# Patient Record
Sex: FEMALE | Race: WHITE
[De-identification: names, ages, dates, MRNs, and addresses within clinical notes are randomized per-mention and may not be internally consistent; named-entity substitution may affect disease eponyms.]

---

## 2020-02-17 ENCOUNTER — HOSPITAL ENCOUNTER (EMERGENCY)
Dept: HOSPITAL 47 - EC | Age: 20
Discharge: HOME | End: 2020-02-17
Payer: COMMERCIAL

## 2020-02-17 VITALS
RESPIRATION RATE: 18 BRPM | DIASTOLIC BLOOD PRESSURE: 78 MMHG | TEMPERATURE: 98.4 F | SYSTOLIC BLOOD PRESSURE: 132 MMHG | HEART RATE: 72 BPM

## 2020-02-17 DIAGNOSIS — Z32.02: ICD-10-CM

## 2020-02-17 DIAGNOSIS — N93.8: Primary | ICD-10-CM

## 2020-02-17 LAB
ALBUMIN SERPL-MCNC: 4.9 G/DL (ref 3.5–5)
ALP SERPL-CCNC: 96 U/L (ref 38–126)
ALT SERPL-CCNC: 135 U/L (ref 4–34)
ANION GAP SERPL CALC-SCNC: 10 MMOL/L
APTT BLD: 24.6 SEC (ref 22–30)
AST SERPL-CCNC: 78 U/L (ref 14–36)
BASOPHILS # BLD AUTO: 0.1 K/UL (ref 0–0.2)
BASOPHILS NFR BLD AUTO: 1 %
BUN SERPL-SCNC: 12 MG/DL (ref 7–17)
CALCIUM SPEC-MCNC: 9.5 MG/DL (ref 8.4–10.2)
CHLORIDE SERPL-SCNC: 104 MMOL/L (ref 98–107)
CO2 SERPL-SCNC: 24 MMOL/L (ref 22–30)
EOSINOPHIL # BLD AUTO: 0.9 K/UL (ref 0–0.7)
EOSINOPHIL NFR BLD AUTO: 8 %
ERYTHROCYTE [DISTWIDTH] IN BLOOD BY AUTOMATED COUNT: 4.93 M/UL (ref 3.8–5.4)
ERYTHROCYTE [DISTWIDTH] IN BLOOD: 12.9 % (ref 11.5–15.5)
GLUCOSE SERPL-MCNC: 78 MG/DL (ref 74–99)
HCT VFR BLD AUTO: 44.4 % (ref 34–46)
HGB BLD-MCNC: 15.2 GM/DL (ref 11.4–16)
INR PPP: 0.9 (ref ?–1.2)
LYMPHOCYTES # SPEC AUTO: 2.6 K/UL (ref 1–4.8)
LYMPHOCYTES NFR SPEC AUTO: 23 %
MCH RBC QN AUTO: 30.8 PG (ref 25–35)
MCHC RBC AUTO-ENTMCNC: 34.2 G/DL (ref 31–37)
MCV RBC AUTO: 90 FL (ref 80–100)
MONOCYTES # BLD AUTO: 0.5 K/UL (ref 0–1)
MONOCYTES NFR BLD AUTO: 4 %
NEUTROPHILS # BLD AUTO: 7.2 K/UL (ref 1.3–7.7)
NEUTROPHILS NFR BLD AUTO: 63 %
PLATELET # BLD AUTO: 360 K/UL (ref 150–450)
POTASSIUM SERPL-SCNC: 4.2 MMOL/L (ref 3.5–5.1)
PROT SERPL-MCNC: 8.3 G/DL (ref 6.3–8.2)
PT BLD: 9.8 SEC (ref 9–12)
SODIUM SERPL-SCNC: 138 MMOL/L (ref 137–145)
WBC # BLD AUTO: 11.4 K/UL (ref 3.8–10.6)

## 2020-02-17 PROCEDURE — 85610 PROTHROMBIN TIME: CPT

## 2020-02-17 PROCEDURE — 81025 URINE PREGNANCY TEST: CPT

## 2020-02-17 PROCEDURE — 80053 COMPREHEN METABOLIC PANEL: CPT

## 2020-02-17 PROCEDURE — 85025 COMPLETE CBC W/AUTO DIFF WBC: CPT

## 2020-02-17 PROCEDURE — 96361 HYDRATE IV INFUSION ADD-ON: CPT

## 2020-02-17 PROCEDURE — 96360 HYDRATION IV INFUSION INIT: CPT

## 2020-02-17 PROCEDURE — 36415 COLL VENOUS BLD VENIPUNCTURE: CPT

## 2020-02-17 PROCEDURE — 99284 EMERGENCY DEPT VISIT MOD MDM: CPT

## 2020-02-17 PROCEDURE — 93975 VASCULAR STUDY: CPT

## 2020-02-17 PROCEDURE — 85730 THROMBOPLASTIN TIME PARTIAL: CPT

## 2020-02-17 PROCEDURE — 76830 TRANSVAGINAL US NON-OB: CPT

## 2020-02-17 NOTE — ED
General Adult HPI





- General


Chief complaint: Vaginal Bleeding


Stated complaint: excessive menstrual bleeding


Time Seen by Provider: 02/17/20 18:12


Source: patient


Mode of arrival: ambulatory


Limitations: no limitations





- History of Present Illness


Initial comments: 


19-year-old female patient presents to the emergency department today for 

evaluation of heavy vaginal bleeding for the last 2 weeks.  Patient states that 

she started her period at the normal time however the bleeding was heavier than 

usual.  States that she is passing clots the size of quarters.  States that the 

bleeding has been heavy every day since its onset.  States that she is changing 

her pad every hour.  Patient states she is having lower abdominal cramping 

consistent with her usual menstrual cramping.  She denies any fever or chills.  

Patient states she has been feeling somewhat more weak and rundown than usual.  

She denies any history of clotting disorders.  Denies any use of anticoagulants 

or antiplatelet medications.  Patient states she has currently being evaluated 

for possible vasovagal syndrome versus seizures. Patient denies any recent rash,

fever, chills, shortness breath, chest pain, nausea, vomiting, diarrhea, 

constipation, back pain, numbness, tingling, dizziness, hematuria, dysuria, 

urinary urgency, urinary frequency, headache, visual changes, or any other 

complaints.








- Related Data


                                    Allergies











Allergy/AdvReac Type Severity Reaction Status Date / Time


 


No Known Allergies Allergy   Verified 02/17/20 17:43














Review of Systems


ROS Statement: 


Those systems with pertinent positive or pertinent negative responses have been 

documented in the HPI.





ROS Other: All systems not noted in ROS Statement are negative.





Past Medical History


Past Medical History: No Reported History


History of Any Multi-Drug Resistant Organisms: None Reported


Additional Past Surgical History / Comment(s): possible seizure disorder


Past Psychological History: Anxiety, Depression


Smoking Status: Never smoker


Past Alcohol Use History: None Reported


Past Drug Use History: Marijuana





General Exam


Limitations: no limitations


General appearance: alert, in no apparent distress, other (This is a well-

developed, well-nourished adult female patient in no acute distress, however 

vital signs upon presentation are temperature 98.5F, pulse 74, respirations 16,

blood pressure 146/79, pulse ox 97% on room air.)


Eye exam: Present: normal appearance, PERRL, EOMI.  Absent: scleral icterus, 

conjunctival injection, periorbital swelling


ENT exam: Present: normal exam, normal oropharynx, mucous membranes moist


Respiratory exam: Present: normal lung sounds bilaterally.  Absent: respiratory 

distress, wheezes, rales, rhonchi, stridor


Cardiovascular Exam: Present: regular rate, normal rhythm, normal heart sounds. 

Absent: systolic murmur, diastolic murmur, rubs, gallop, clicks


GI/Abdominal exam: Present: soft, normal bowel sounds.  Absent: distended, 

tenderness, guarding, rebound, rigid


External exam: Present: normal external exam


Speculum exam: Present: vaginal bleeding (Moderate dark red vaginal bleeding, 

small clots noted in the vaginal vault.), other (Cervix appears normal)


Neurological exam: Present: alert, oriented X3, CN II-XII intact


Psychiatric exam: Present: normal affect, normal mood


Skin exam: Present: warm, dry, intact, normal color.  Absent: rash





Course


                                   Vital Signs











  02/17/20 02/17/20





  17:41 20:56


 


Temperature 98.5 F 98.4 F


 


Pulse Rate 74 72


 


Respiratory 16 18





Rate  


 


Blood Pressure 146/79 132/78


 


O2 Sat by Pulse 97 100





Oximetry  














Medical Decision Making





- Medical Decision Making





19-year-old female patient presented to the emergency department today for 

evaluation of heavy vaginal bleeding for the last 2 weeks.  Physical examination

reveals mild suprapubic abdominal tenderness.  Skin is pink, warm, dry.  Vital 

signs are within normal ranges.  Labs reviewed and are unremarkable.  HCG is 

negative.  Ultrasound was obtained and showed no abnormalities.  Patient did 

take 3 days of a birth control medication regimen a side effects and had to stop

this.  We discuss this as a cause for her symptoms.  She'll be discharged 

vomited gynecologist or her primary care physician for recheck in 1-2 days.  

Return parameters were discussed in detail.  She verbalizes understanding and 

agrees with this plan.





- Lab Data


Result diagrams: 


                                 02/17/20 18:40





                                 02/17/20 18:40


                                   Lab Results











  02/17/20 02/17/20 02/17/20 Range/Units





  18:40 18:40 18:40 


 


WBC  11.4 H    (3.8-10.6)  k/uL


 


RBC  4.93    (3.80-5.40)  m/uL


 


Hgb  15.2    (11.4-16.0)  gm/dL


 


Hct  44.4    (34.0-46.0)  %


 


MCV  90.0    (80.0-100.0)  fL


 


MCH  30.8    (25.0-35.0)  pg


 


MCHC  34.2    (31.0-37.0)  g/dL


 


RDW  12.9    (11.5-15.5)  %


 


Plt Count  360    (150-450)  k/uL


 


Neutrophils %  63    %


 


Lymphocytes %  23    %


 


Monocytes %  4    %


 


Eosinophils %  8    %


 


Basophils %  1    %


 


Neutrophils #  7.2    (1.3-7.7)  k/uL


 


Lymphocytes #  2.6    (1.0-4.8)  k/uL


 


Monocytes #  0.5    (0-1.0)  k/uL


 


Eosinophils #  0.9 H    (0-0.7)  k/uL


 


Basophils #  0.1    (0-0.2)  k/uL


 


PT     (9.0-12.0)  sec


 


INR     (<1.2)  


 


APTT     (22.0-30.0)  sec


 


Sodium   138   (137-145)  mmol/L


 


Potassium   4.2   (3.5-5.1)  mmol/L


 


Chloride   104   ()  mmol/L


 


Carbon Dioxide   24   (22-30)  mmol/L


 


Anion Gap   10   mmol/L


 


BUN   12   (7-17)  mg/dL


 


Creatinine   0.74   (0.52-1.04)  mg/dL


 


Est GFR (CKD-EPI)AfAm   >90   (>60 ml/min/1.73 sqM)  


 


Est GFR (CKD-EPI)NonAf   >90   (>60 ml/min/1.73 sqM)  


 


Glucose   78   (74-99)  mg/dL


 


Calcium   9.5   (8.4-10.2)  mg/dL


 


Total Bilirubin   0.5   (0.2-1.3)  mg/dL


 


AST   78 H   (14-36)  U/L


 


ALT   135 H   (4-34)  U/L


 


Alkaline Phosphatase   96   ()  U/L


 


Total Protein   8.3 H   (6.3-8.2)  g/dL


 


Albumin   4.9   (3.5-5.0)  g/dL


 


Urine HCG, Qual    Not Detected  (Not Detectd)  














  02/17/20 Range/Units





  18:40 


 


WBC   (3.8-10.6)  k/uL


 


RBC   (3.80-5.40)  m/uL


 


Hgb   (11.4-16.0)  gm/dL


 


Hct   (34.0-46.0)  %


 


MCV   (80.0-100.0)  fL


 


MCH   (25.0-35.0)  pg


 


MCHC   (31.0-37.0)  g/dL


 


RDW   (11.5-15.5)  %


 


Plt Count   (150-450)  k/uL


 


Neutrophils %   %


 


Lymphocytes %   %


 


Monocytes %   %


 


Eosinophils %   %


 


Basophils %   %


 


Neutrophils #   (1.3-7.7)  k/uL


 


Lymphocytes #   (1.0-4.8)  k/uL


 


Monocytes #   (0-1.0)  k/uL


 


Eosinophils #   (0-0.7)  k/uL


 


Basophils #   (0-0.2)  k/uL


 


PT  9.8  (9.0-12.0)  sec


 


INR  0.9  (<1.2)  


 


APTT  24.6  (22.0-30.0)  sec


 


Sodium   (137-145)  mmol/L


 


Potassium   (3.5-5.1)  mmol/L


 


Chloride   ()  mmol/L


 


Carbon Dioxide   (22-30)  mmol/L


 


Anion Gap   mmol/L


 


BUN   (7-17)  mg/dL


 


Creatinine   (0.52-1.04)  mg/dL


 


Est GFR (CKD-EPI)AfAm   (>60 ml/min/1.73 sqM)  


 


Est GFR (CKD-EPI)NonAf   (>60 ml/min/1.73 sqM)  


 


Glucose   (74-99)  mg/dL


 


Calcium   (8.4-10.2)  mg/dL


 


Total Bilirubin   (0.2-1.3)  mg/dL


 


AST   (14-36)  U/L


 


ALT   (4-34)  U/L


 


Alkaline Phosphatase   ()  U/L


 


Total Protein   (6.3-8.2)  g/dL


 


Albumin   (3.5-5.0)  g/dL


 


Urine HCG, Qual   (Not Detectd)  














- Radiology Data


Radiology results: report reviewed, image reviewed


Ultrasound of the pelvis was obtained.  Report was reviewed in its entirety.  

Impression by Dr. Vivar shows no adnexal mass or free fluid.  No evidence of

ovarian torsion.  Normal endometrium.





Disposition


Clinical Impression: 


 Dysfunctional uterine bleeding





Disposition: HOME SELF-CARE


Condition: Good


Instructions (If sedation given, give patient instructions):  Dysfunctional 

Uterine Bleeding (ED)


Additional Instructions: 


Increase fluids.  Rest.  Follow-up with gynecologist or your primary care 

physician for further evaluation.  Return to the emergency department 

immediately for any new, worsening, or concerning symptoms.


Is patient prescribed a controlled substance at d/c from ED?: No


Referrals: 


Kenneth Barroso MD [STAFF PHYSICIAN] - 1-2 days


Time of Disposition: 20:40

## 2020-02-17 NOTE — US
EXAMINATION TYPE: US transvaginal

 

DATE OF EXAM: 2/17/2020

 

COMPARISON: NONE

 

CLINICAL HISTORY: Heavy vaginal bleeding. Heavy vaginal bleeding. Patient is on day 14 of bleeding.

 

TECHNIQUE:  Transvaginal (TV).  

 

Date of LMP:  02/03/2020

 

EXAM MEASUREMENTS:

 

Uterus:  7.3 x 4.7 x 4.1 cm

Endometrial Stripe: 1.04 cm

Right Ovary:  4.0 x 2.4 x 2.8 cm

Left Ovary:  3.9 x 2.5 x 2.8 cm

 

 

 

1. Uterus:  Retroverted Hypoechoic areas seen in the cervix. Largest: 0.6 x 0.6 x 0.8 cm.

2. Endometrium:  Measures 1.04 cm. Measures thick. 

3. Right Ovary:  Measures slightly enlarged. Follicles seen.

4. Left Ovary:  Measures slightly enlarged. Follicles seen.

**Spectral, color and waveform doppler imaging shows arterial and venous flow within the ovaries; the
re is no evidence for ovarian torsion.

5. Bilateral Adnexa:  Appear to be wnl.

6. Posterior cul-de-sac:  Fluid is seen.

 

 

 

IMPRESSION: No adnexal mass or free fluid. No evidence of ovarian torsion. Normal endometrium.